# Patient Record
Sex: FEMALE | Race: WHITE | NOT HISPANIC OR LATINO | URBAN - METROPOLITAN AREA
[De-identification: names, ages, dates, MRNs, and addresses within clinical notes are randomized per-mention and may not be internally consistent; named-entity substitution may affect disease eponyms.]

---

## 2017-12-07 ENCOUNTER — APPOINTMENT (OUTPATIENT)
Age: 37
Setting detail: DERMATOLOGY
End: 2017-12-12

## 2017-12-07 DIAGNOSIS — L71.8 OTHER ROSACEA: ICD-10-CM

## 2017-12-07 DIAGNOSIS — L20.89 OTHER ATOPIC DERMATITIS: ICD-10-CM

## 2017-12-07 PROCEDURE — OTHER COUNSELING: OTHER

## 2017-12-07 PROCEDURE — OTHER MIPS QUALITY: OTHER

## 2017-12-07 PROCEDURE — 99202 OFFICE O/P NEW SF 15 MIN: CPT

## 2017-12-07 PROCEDURE — OTHER PRESCRIPTION: OTHER

## 2017-12-07 PROCEDURE — OTHER TREATMENT REGIMEN: OTHER

## 2017-12-07 RX ORDER — HALCINONIDE CREAM 1 MG/G
CREAM TOPICAL
Qty: 1 | Refills: 1 | Status: ERX | COMMUNITY
Start: 2017-12-07

## 2017-12-07 RX ORDER — METRONIDAZOLE 10 MG/G
GEL TOPICAL QHS
Qty: 1 | Refills: 1 | Status: ERX | COMMUNITY
Start: 2017-12-07

## 2017-12-07 ASSESSMENT — LOCATION DETAILED DESCRIPTION DERM
LOCATION DETAILED: RIGHT CENTRAL MALAR CHEEK
LOCATION DETAILED: LEFT INFERIOR CENTRAL MALAR CHEEK
LOCATION DETAILED: RIGHT RADIAL DORSAL HAND
LOCATION DETAILED: LEFT RADIAL DORSAL HAND

## 2017-12-07 ASSESSMENT — LOCATION SIMPLE DESCRIPTION DERM
LOCATION SIMPLE: LEFT CHEEK
LOCATION SIMPLE: LEFT HAND
LOCATION SIMPLE: RIGHT HAND
LOCATION SIMPLE: RIGHT CHEEK

## 2017-12-07 ASSESSMENT — LOCATION ZONE DERM
LOCATION ZONE: FACE
LOCATION ZONE: HAND

## 2017-12-07 NOTE — PROCEDURE: TREATMENT REGIMEN
Initiate Treatment: Halog
Detail Level: Simple
Plan: Apply Halog twice daily followed by hand barrier cream

## 2018-02-15 ENCOUNTER — APPOINTMENT (OUTPATIENT)
Age: 38
Setting detail: DERMATOLOGY
End: 2018-02-16

## 2018-02-15 DIAGNOSIS — L30.8 OTHER SPECIFIED DERMATITIS: ICD-10-CM

## 2018-02-15 PROCEDURE — OTHER OTHER: OTHER

## 2018-02-15 PROCEDURE — OTHER PRESCRIPTION: OTHER

## 2018-02-15 PROCEDURE — OTHER COUNSELING: OTHER

## 2018-02-15 PROCEDURE — OTHER TREATMENT REGIMEN: OTHER

## 2018-02-15 PROCEDURE — 99213 OFFICE O/P EST LOW 20 MIN: CPT

## 2018-02-15 RX ORDER — PROTECTIVES COMBINATION NO.2
CREAM (GRAM) TOPICAL
Qty: 1 | Refills: 3 | Status: ERX | COMMUNITY
Start: 2018-02-15

## 2018-02-15 ASSESSMENT — LOCATION DETAILED DESCRIPTION DERM
LOCATION DETAILED: RIGHT RADIAL DORSAL HAND
LOCATION DETAILED: LEFT ULNAR DORSAL HAND
LOCATION DETAILED: LEFT RADIAL DORSAL HAND

## 2018-02-15 ASSESSMENT — LOCATION SIMPLE DESCRIPTION DERM
LOCATION SIMPLE: LEFT HAND
LOCATION SIMPLE: RIGHT HAND

## 2018-02-15 ASSESSMENT — LOCATION ZONE DERM: LOCATION ZONE: HAND

## 2018-02-15 NOTE — PROCEDURE: OTHER
Other (Free Text): Advised to stop excessive washing of hands.  No hand sanitizers.  Moisturized frequently with hand repair cream followed by barrier cream.
Detail Level: Detailed
Note Text (......Xxx Chief Complaint.): This diagnosis correlates with the

## 2018-02-15 NOTE — PROCEDURE: TREATMENT REGIMEN
Continue Regimen: Halog 0.1%: apply 1-2X daily to hands. No greater the 2 weeks/month
Plan: F/U in 2 weeks if rash persists
Detail Level: Zone
Initiate Treatment: Tetrix Cream qd

## 2019-04-02 ENCOUNTER — APPOINTMENT (OUTPATIENT)
Age: 39
Setting detail: DERMATOLOGY
End: 2019-04-03

## 2019-04-02 DIAGNOSIS — L30.8 OTHER SPECIFIED DERMATITIS: ICD-10-CM

## 2019-04-02 DIAGNOSIS — L01.01 NON-BULLOUS IMPETIGO: ICD-10-CM

## 2019-04-02 PROCEDURE — 99213 OFFICE O/P EST LOW 20 MIN: CPT

## 2019-04-02 PROCEDURE — OTHER COUNSELING: OTHER

## 2019-04-02 PROCEDURE — OTHER PRESCRIPTION: OTHER

## 2019-04-02 RX ORDER — NEOMYCIN SULFATE AND FLUOCINOLONE ACETONIDE 3.5; .25 MG/G; MG/G
CREAM TOPICAL
Qty: 1 | Refills: 2 | Status: ERX | COMMUNITY
Start: 2019-04-02

## 2019-04-02 RX ORDER — OZENOXACIN 10 MG/G
CREAM TOPICAL
Qty: 1 | Refills: 2 | Status: ERX | COMMUNITY
Start: 2019-04-02

## 2019-04-02 ASSESSMENT — LOCATION DETAILED DESCRIPTION DERM
LOCATION DETAILED: RIGHT PROXIMAL DORSAL MIDDLE FINGER
LOCATION DETAILED: 3RD WEB SPACE RIGHT HAND
LOCATION DETAILED: LEFT PROXIMAL DORSAL MIDDLE FINGER

## 2019-04-02 ASSESSMENT — LOCATION ZONE DERM
LOCATION ZONE: HAND
LOCATION ZONE: FINGER

## 2019-04-02 ASSESSMENT — LOCATION SIMPLE DESCRIPTION DERM
LOCATION SIMPLE: RIGHT MIDDLE FINGER
LOCATION SIMPLE: LEFT MIDDLE FINGER
LOCATION SIMPLE: RIGHT HAND

## 2019-04-09 ENCOUNTER — RX ONLY (RX ONLY)
Age: 39
End: 2019-04-09

## 2019-04-09 RX ORDER — HALOBETASOL PROPIONATE 0.1 MG/G
LOTION TOPICAL
Qty: 1 | Refills: 0 | Status: ERX | COMMUNITY
Start: 2019-04-09

## 2019-08-21 ENCOUNTER — RX ONLY (RX ONLY)
Age: 39
End: 2019-08-21

## 2019-08-21 RX ORDER — HALOBETASOL PROPIONATE 0.1 MG/G
LOTION TOPICAL
Qty: 1 | Refills: 0 | Status: ERX

## 2020-03-20 ENCOUNTER — RX ONLY (RX ONLY)
Age: 40
End: 2020-03-20

## 2020-03-20 RX ORDER — HALOBETASOL PROPIONATE 0.1 MG/G
LOTION TOPICAL
Qty: 1 | Refills: 0 | Status: ERX

## 2020-08-19 ENCOUNTER — APPOINTMENT (RX ONLY)
Dept: URBAN - METROPOLITAN AREA CLINIC 166 | Facility: CLINIC | Age: 40
Setting detail: DERMATOLOGY
End: 2020-08-19

## 2020-08-19 DIAGNOSIS — L30.1 DYSHIDROSIS [POMPHOLYX]: ICD-10-CM

## 2020-08-19 PROCEDURE — ? TREATMENT REGIMEN

## 2020-08-19 PROCEDURE — 99202 OFFICE O/P NEW SF 15 MIN: CPT | Mod: 95

## 2020-08-19 PROCEDURE — ? COUNSELING

## 2020-08-19 PROCEDURE — ? PRESCRIPTION

## 2020-08-19 PROCEDURE — ? TELEHEALTH ASSESSMENT

## 2020-08-19 PROCEDURE — ? REASON FOR TELEMEDICINE VISIT

## 2020-08-19 RX ORDER — BETAMETHASONE DIPROPIONATE 0.5 MG/G
OINTMENT TOPICAL
Qty: 1 | Refills: 1 | Status: ERX | COMMUNITY
Start: 2020-08-19

## 2020-08-19 RX ADMIN — BETAMETHASONE DIPROPIONATE: 0.5 OINTMENT TOPICAL at 00:00

## 2020-08-19 ASSESSMENT — LOCATION ZONE DERM: LOCATION ZONE: HAND

## 2020-08-19 ASSESSMENT — LOCATION SIMPLE DESCRIPTION DERM
LOCATION SIMPLE: LEFT HAND
LOCATION SIMPLE: RIGHT HAND

## 2020-08-19 ASSESSMENT — LOCATION DETAILED DESCRIPTION DERM
LOCATION DETAILED: LEFT RADIAL PALM
LOCATION DETAILED: RIGHT RADIAL PALM

## 2020-08-19 NOTE — HPI: RASH (ECZEMA)
How Severe Is Your Eczema?: moderate
Is This A New Presentation, Or A Follow-Up?: Rash
Additional History: I have eczema and need refills on my prescriptions. I currently use Xepi cream 1% and Bryhall lotion 0.01%. I am currently out of the medication and did not have refills. Since the Covid Pandemic and having to wash my hands more often my skin is irritated. 2 weekends ago I noticed my hands and fingers had bumps on them and looked like blisters. I have scaley patches and dry skin now and it sometime itches in the middle of the night and when I wake up in the morning. I usually get what look like cuts on my fingers. I bought SkinSmart  Antimicrobial Eczema Therapy at [facility] and it helped a lot. I didn't use it as much over the weekend and didn't use as much lotion as I do at work so am not sure if that is why my skin is scaley and dry. \\n\\nDue to the emergency of the COVID-19 pandemic, as declared by the World Health Organization on March 11, 2020, medical providers are rapidly shifting to accommodate the need to treat patients in conjunction with unprecedented guidance from federal, state and local authorities - which include, but are not limited to, self-quarantines and/or limiting physical proximity to others under any number of circumstances.\\n\\nIt is within this context (and with the understanding that this method of patient encounter is in the patient’s best interest as well as the health and safety of other patients and the public) that “telehealth” is being provided for this patient encounter rather than a face-to-face visit. The patient has been advised of the potential risks and limitations of this mode of treatment (including, but not limited to, the absence of in-person examination) and has requested and agreed to be treated in a remote fashion in spite of them. The patient is aware that we will bill their insurance for this visit following Medicare guidelines, but they may be responsible for some or all of the visit charges if their insurance deems this \"non-covered.” Any and all of the patient’s/patient’s family’s questions on this issue have been answered. The patient has also been advised to contact this office for worsening conditions or problems, and seek emergency medical treatment and/or call 911 if the patient deems either necessary.”\\n

## 2020-08-19 NOTE — PROCEDURE: TREATMENT REGIMEN
Otc Regimen: 3M Cavilon skin barrier film\\nNeutrogena Norwegian formula hand cream or Aveeno cracked skin relief
Initiate Treatment: betamethasone dipropionate 0.05 % topical ointment  Apply to affected areas (red, scaly, itchy) on palms twice daily for up to two weeks at a time as needed for flares.
Plan: Limit hand washing and minimize water exposure, rubber gloves w/ wet work \\n\\nPrevious rx from last derm= xepi cream (antibacterial)+bryhall. Pt prefers to use regular pharmacy vs. specialty if possible
Detail Level: Zone

## 2020-11-04 RX ORDER — BETAMETHASONE DIPROPIONATE 0.5 MG/G
OINTMENT TOPICAL
Qty: 1 | Refills: 1 | Status: ERX

## 2021-03-18 ENCOUNTER — RX ONLY (OUTPATIENT)
Age: 41
Setting detail: RX ONLY
End: 2021-03-18

## 2021-03-18 RX ORDER — CLOBETASOL PROPIONATE 0.5 MG/G
OINTMENT TOPICAL
Qty: 1 | Refills: 2 | Status: ERX | COMMUNITY
Start: 2021-03-18